# Patient Record
Sex: FEMALE | Race: OTHER
[De-identification: names, ages, dates, MRNs, and addresses within clinical notes are randomized per-mention and may not be internally consistent; named-entity substitution may affect disease eponyms.]

---

## 2017-08-08 ENCOUNTER — HOSPITAL ENCOUNTER (OUTPATIENT)
Dept: HOSPITAL 63 - DXRADRC | Age: 42
Discharge: HOME | End: 2017-08-08
Attending: PHYSICIAN ASSISTANT
Payer: SELF-PAY
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
## 2017-08-08 DIAGNOSIS — S92.341A: Primary | ICD-10-CM

## 2017-08-08 PROCEDURE — 73630 X-RAY EXAM OF FOOT: CPT

## 2017-08-08 NOTE — RAD
Indication fall one day earlier. Pain.



AP oblique and lateral views of the right foot were obtained.



There is a nondisplaced traumatic fracture at the base of the shaft of the

fourth metatarsal. This does not appear to extend to the cortex proximally. An

additional bony abnormality is not seen. Soft tissue swelling is noted.



IMPRESSION: Fractured fourth metatarsal

## 2017-09-28 ENCOUNTER — HOSPITAL ENCOUNTER (OUTPATIENT)
Dept: HOSPITAL 63 - DXRADRC | Age: 42
Discharge: HOME | End: 2017-09-28
Attending: NURSE PRACTITIONER
Payer: COMMERCIAL
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
## 2017-09-28 DIAGNOSIS — M25.572: Primary | ICD-10-CM

## 2017-09-28 DIAGNOSIS — R20.0: ICD-10-CM
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
## 2017-09-28 PROCEDURE — 73610 X-RAY EXAM OF ANKLE: CPT

## 2017-09-28 NOTE — RAD
3 views left ankle  9/28/2017 2:00 AM



Indication: ACUTE LEFT ANKLE PAIN, FALL 8/7/17



Comparison: None



Findings: There is no fracture or dislocation identified. Articular surfaces

are uninterrupted. Soft tissues are unremarkable.



Impression: No evidence of acute osseous abnormality

## 2020-11-25 VITALS — SYSTOLIC BLOOD PRESSURE: 111 MMHG | DIASTOLIC BLOOD PRESSURE: 66 MMHG

## 2021-05-11 ENCOUNTER — HOSPITAL ENCOUNTER (OUTPATIENT)
Dept: HOSPITAL 61 - RT | Age: 46
End: 2021-05-11
Attending: INTERNAL MEDICINE
Payer: COMMERCIAL

## 2021-05-11 DIAGNOSIS — G47.19: Primary | ICD-10-CM

## 2021-05-11 DIAGNOSIS — R40.0: ICD-10-CM

## 2021-05-13 NOTE — SLEEP
DATE OF STUDY: 05/12/2021

REFERRING PHYSICIAN:  ANNMARIE Young



HISTORY:  The patient is a 45-year-old who weighs 235 pounds with a BMI of 39.1.

 The patient's Aurora score was 6.  The patient underwent home sleep study 

performed by [facility].



SLEEP ARCHITECTURE:  Total recording time was 358 minutes.



During the night study, the patient had 2 obstructive apneas, 8 central apneas, 

32 mixed apneas and 92 hypopneas.  The patient's AHI was 23 per hour.



Nocturnal oximetry study revealed an average oxygen saturation 94% with a lowest

of 75%.  7.4 minutes were spent with oxygen saturation less than 90%.



Mean heart rate 76 beats per minute.



IMPRESSION:

1.  Moderate obstructive sleep apnea at an AHI of 23 per hour.

2.  Mild nocturnal hypoxia secondary to obstructive sleep apnea.



RECOMMENDATIONS:

1.  The patient would benefit from treatment of sleep apnea with CPAP.

2.  Once the patient is optimally treated, then follow up in 4-6 weeks to assess

compliance and to document clinical improvement with CPAP.

3.  Weight loss is advised.

4.  Avoid CNS depressants.

5.  Cautioned regarding driving until symptoms of sleep apnea resolve with CPAP.







WILLIAM/SUM

DR: Umer   DD: 05/13/2021 08:29

DT: 05/13/2021 08:39   TID: [id number]

CC:     ANNMARIE RASMUSSEN, NIMESH BEST MD